# Patient Record
(demographics unavailable — no encounter records)

---

## 2025-05-23 NOTE — HISTORY OF PRESENT ILLNESS
[de-identified] : Gino Carter is a 66-year-old male who presents for a new patient visit.  He reports previous c-spine fusion - per pt possibly C2-T3 in 2015.  He states that prior that that surgery he has RUE pain extending to right 4tth/5th digits with occasional numbness and tingling.  He reports that the same symptoms started about 3-4 months ago.  He reports left posterior neck pain that shoots up the left posterior head intermittently amd resolves spontaneously after approximately 10 seconds.  He denies weakness in the upper extremities, gait imbalance, bowel and bladder issues. Will order neuro imaging with plan to follow up with Dr. Pineda after imaging.

## 2025-05-23 NOTE — REVIEW OF SYSTEMS
[Numbness] : numbness [Tingling] : tingling [Negative] : Heme/Lymph [de-identified] : neck pain and RUE [[email or web address]] : extremity pain

## 2025-05-23 NOTE — PHYSICAL EXAM
[General Appearance - Alert] : alert [General Appearance - Well Nourished] : well nourished [General Appearance - Well-Appearing] : healthy appearing [Oriented To Time, Place, And Person] : oriented to person, place, and time [Person] : oriented to person [Place] : oriented to place [Time] : oriented to time [Motor Tone] : muscle tone was normal in all four extremities [Involuntary Movements] : no involuntary movements were seen [5] : L4/5 ankle dorsiflexors 5/5 [2+] : Patella left 2+ [Sclera] : the sclera and conjunctiva were normal [Outer Ear] : the ears and nose were normal in appearance [Neck Appearance] : the appearance of the neck was normal [] : no respiratory distress [Respiration, Rhythm And Depth] : normal respiratory rhythm and effort [Exaggerated Use Of Accessory Muscles For Inspiration] : no accessory muscle use [Heart Rate And Rhythm] : heart rate was normal and rhythm regular [Abnormal Walk] : normal gait [Skin Color & Pigmentation] : normal skin color and pigmentation